# Patient Record
Sex: FEMALE | Race: WHITE
[De-identification: names, ages, dates, MRNs, and addresses within clinical notes are randomized per-mention and may not be internally consistent; named-entity substitution may affect disease eponyms.]

---

## 2017-11-15 NOTE — CARDIAC CATHETERIZATION
Procedure Note


Procedure Date


Nov 15, 2017.





Pre-Procedure Diagnosis


Valvular Disease





AUC Score


7





Post-Procedure Diagnosis


Mild CAD





Procedure(s) Performed


Coronary Angiography, Aortography





Cardiologist


Dylan





Assistant(s)


Liane





Estimated Blood Loss


12





Medication(s)


Fentanyl, Heparin, Nicardipine, Versed, Lidocaine 1%





Summary of Findings


Indication: Severe aortic stenosis, bicuspid aortic valve





Access: 5Fr Right radial artery


Catheters: Tiger, pigtail





Findings:


LM - Angiographically normal


LAD - Large caliber vessel; mid segment with diffuse 30-40% stenosis and 

myocardial bridging; distal segment with luminal irregularities as wraps around 

apex. 1st diagonal with 40% ostial stenosis.


Circumflex - Moderate caliber vessel with 30-40% ostial stenosis;  Luminal 

irregularities in terminal OM2.  


RCA - Dominant, angiographically normal





Aortography - 2+ AI; No thoracic aortic aneurysm.  No coarctation.  





Arterial Closure: TR Band





Summary:


1. Mild to moderate non-obstructive coronary artery disease


 -  30-40% diffuse mid LAD disease with myocardial bridging


 -  30-40% ostial circumflex





2. Moderate aortic insufficiency (2+)





Recommendations:


Planned follow-up with On[facility] CT surgery for AVR 


Continued ASCVD risk factor modification per Dr. Thurston





Hemodynamics


Rest Ao:


102/55/75


Final Ao:


95/51/71


LV:


--





Recommendations


Medical therapy and/or Counseling, valve replacement





Specimens


None





Radiation Exposure (mGy)


377





Contrast (mls)


80 Visi





Fluids (cc crystalloids)


77 NS





Drains


None





Anesthesia


Moderate





Procedural Complication(s)


None





Disposition


Cath Lab Holding/Recovery





ACC Data


Cardiac Status


Clinical evaluation leading to the procedure


CAD Presntation:  Sx unlikely to be ischemic


Anginal Classification:  CCS II


Heart Failure:  No, NYHA Class: CCS I


Cardiogenic Shock w/in 24Hrs:  No


Cardiac Arrest w/in 24Hrs:  No


Imaging studies past 6 months:  Yes


Stress studies past 6 months:  No





Aortography


Aortic Regurgitation:  2+





Closure Device


Percutaneous Entry Location:  Radial


Closure Device:  Radial Band


Recommendations:  Medical therapy and/or Counseling, valve replacement





Intraprocedure Events


Significant Dissection:  No


Perforation:  No

## 2017-11-15 NOTE — PROCEDURE NOTE
Pre-Mod Sedation Assessment


General


Date of Moderate Sedation:


Nov 15, 2017.


Vital Signs:





Vital Signs Past 12 Hours








  Date Time  Temp Pulse Resp B/P (MAP) Pulse Ox O2 Delivery O2 Flow Rate FiO2


 


11/15/17 09:05 36.3 71 16 125/65 98 Room Air  











Review


Cardiovascular:  regular rate, rhythm, no edema


Abdomen:  normal bowel sounds, non tender


Lungs:  chest non-tender, lungs clear


Airway Class:  II





Pre-Sedation Airway Assessment


Oral Cavity:  WNL


Short Thick Neck:  No


Hx of Sleep Apnea:  No


Smoking Status:  Never Smoker


Mallampati Classification:  Class II


ASA Classification:  Class III





Procedure Planning


Contraindications-for Mod Sed:  None


Yes





Notes








The planned sedation has been discussed with the patient and consent obtained.  

I have


identified the patient, determined the appropriateness of sedation and have 

assessed the


patient immediately prior to the procedure.  





All medicine(s) and interventions are by my order.

## 2017-11-15 NOTE — PROCEDURE NOTE
Post-Mod Sedation Assessment


General


Date of Moderate Sedation


Nov 15, 2017.


Vital Signs:





Vital Signs Past 12 Hours








  Date Time  Temp Pulse Resp B/P (MAP) Pulse Ox O2 Delivery O2 Flow Rate FiO2


 


11/15/17 09:05 36.3 71 16 125/65 98 Room Air  











Review - Discharge Criteria


Vital Signs Stable:  Yes


Alert/Oriented/Conversant:  Yes


Returned to Baseline Mental St:  Yes


Nausea Absent/Minimal:  Yes


Pain/Discomfort/Absent/Minimal:  Yes


Normal/Baseline Respirations:  Yes


Active Bleeding?:  No


Pt Received D/C Instructions:  N/A


Prescriptions Given:  Transmitted





Specific Proced. D/C Criteria


Distal Pulses Present (Cardiac:  Yes


Groin site assessed-Card Cath:  N/A


Voided Prior To Discharge:  N/A





Discharged Patients


Adult Escort/Transportation:  Yes

## 2017-11-15 NOTE — DISCHARGE INSTRUCTIONS
Discharge Instructions


Procedure


Procedure Date:


Nov 15, 2017.


Reason for Visit:


Aortic Stenosis Severe *Dr Richardson To Do*.





Discharge


Discharge Date:


Nov 15, 2017.


Discharge Diagnosis:


Aortic stenosis


Last Recorded Wt (Kilograms):  39.5





Anesthesia


Post Anesthesia Instructions:


If you have had General Anesthesia or IV Sedation:





*  Do not drive today.





*  Resume driving when surgeon permits.





*  Do not make important decisions or sign legal documents today.





*  Call surgeon for:


   1.  Temperature elevations greater than 101 degrees F.


   2.  Uncontrollable pain.


   3.  Excessive bleeding.


   4.  Persistent nausea and vomiting.


   5.  Medication intolerance (nausea, vomiting or rash).





*  For nausea and vomiting use only clear liquids such as: tea, soda, bouillon 

until


   nausea subsides, then gradually increase diet as tolerated.





*  If you have any concerns or questions, call your surgeon's office.  If 

physician is 


   unavailable and it is an emergency, call 911 or go to the nearest emergency 

room.





Instructions


Activity Recommendations:  limitations as noted below


Recommended Home Diet:  resume previous diet, low cholesterol


Allergies:  


Coded Allergies:  


     No Known Allergies (Unverified , 3/3/14)





Follow Up


Additional Instructions:


ACTIVITY RECOMMENDATIONS:





It is common to feel weak and fatigue for a few days.





*  Do not drive or operate any motorized equipment for the next


    2 days.





*  Do not lift anything heavier than 10 pounds for the next three


    days.





*  Do not engage in vigorous exercise or any sports for the next


    five days.





*  You may shower the day after your procedure, but do not 


    immerse the area for three days.  Cleanse the site gently with


    soap and water.








SPECIAL CARE INSTRUCTIONS:





* You may replace the pressure dressing or band-aid the morning 


after the procedure.





* After your procedure, it is normal to have a small bruise or small lump


at the site.  Examine your site daily for any change in the bruise or lump,


redness, swelling, drainage or numbness.  


Notify your doctor if any change.





BLEEDING:





* If there is a small amount of bleeding at the site, lie down and apply


firm pressure with a clean cloth for ten minutes.  When the bleeding stops,


lie quietly keeping the procedure limb straight for six hours.  


Notify your doctor as soon as possible.





* If the bleeding does not stop after ten minutes or if there is a large


amount of bleeding or spurting, call 911 immediately.  Continue to lie 


down and hold firm pressure until help arrives.





SKIN IRRITATION:





*  You may experience some redness and/or swelling in the area where radiation 

was


administered.  If any skin irritation occurs, please contact your family 

physician.








FOLLOW UP VISIT:





Keep any scheduled doctor appointments.





Jones Arce Recommendations:


 


Call your doctor if:





*  Temperature above 101 degrees


*  Pain not relieved by pain medicine ordered


*  There is increased drainage or redness from any incision


*  You have any unanswered questions or concerns.





Your Doctors Instructions noted above were prepared by provider Ramón Richardson.


Patient Signature Section:





 Patient Instructions Signature Page














Maribel Louis 











Patient (or Guardian) Signature/Date:____________________________________ I 

have read and understand the instructions given to me by my caregivers.








Caregiver/RN/Doctor Signature/Date:____________________________________











The above-named patient and/or guardian has received patient instructions on 

this date.





























+  Original Patient Signature Page (only) stays with chart.  Please make copy 

for patient.

## 2018-01-22 NOTE — DIAGNOSTIC IMAGING REPORT
ULTRASOUND LEFT UPPER EXTREMITY VENOUS



CLINICAL HISTORY:  Left arm pain.



COMPARISON STUDY: No priors.



TECHNIQUE: Real-time, grayscale, and color Doppler sonography of the deep veins

of the left upper extremity is performed. Compression and augmentation were

utilized.



FINDINGS: There is no sonographic evidence of deep venous thrombosis identified

in the left upper extremity. The left internal jugular, axillary, and brachial

veins are patent and normally compressible. Normal venous waveforms and

augmentation are seen within the left subclavian vein. The cephalic and basilic

veins are clear. The visualized radial and ulnar veins are patent.



IMPRESSION: There is no sonographic evidence of deep venous thrombosis

identified in the left upper extremity.







Electronically signed by:  Wilber Walker M.D.

1/22/2018 2:51 PM



Dictated Date/Time:  1/22/2018 2:51 PM

## 2018-04-16 NOTE — MAMMOGRAPHY REPORT
BILATERAL DIGITAL SCREENING MAMMOGRAM TOMOSYNTHESIS WITH CAD: 4/13/2018

CLINICAL HISTORY: Routine screening.  Patient has no complaints.  





TECHNIQUE:  Breast tomosynthesis in addition to standard 2D mammography was performed. Current study 
was also evaluated with a Computer Aided Detection (CAD) system.  



COMPARISON: Comparison is made to exams dated:  4/13/2010 mammogram - [facility] [address] an
d 12/9/2008.   



BREAST COMPOSITION:  The tissue of both breasts is extremely dense, which lowers the sensitivity of m
ammography.  



FINDINGS:  No suspicious masses, calcifications, or areas of architectural distortion are noted in ei
ther breast.  The breasts are diffusely increased in density and smaller in size compared to the 2010
 and 2008 exams, which may be due to interval weight loss.  A linear scar marker denotes a scar on th
e right superior breast.  Scattered bilateral benign-appearing calcifications are again noted.



IMPRESSION:  ACR BI-RADS CATEGORY 2: BENIGN

There is no mammographic evidence of malignancy. A 1 year screening mammogram is recommended.  The pa
tient will receive written notification of the results.  





Approximately 10% of breast cancers are not detected with mammography. A negative mammographic report
 should not delay biopsy if a clinically suggestive mass is present.



Sheba Angulo M.D.          

ah/:4/13/2018 16:18:14  



Imaging Technologist: Ainsley CHRIS(DARCI)(M), [facility] [address]

letter sent: Normal 1/2  

BI-RADS Code: ACR BI-RADS Category 2: Benign

## 2018-07-29 ENCOUNTER — HOSPITAL ENCOUNTER (OUTPATIENT)
Dept: HOSPITAL 45 - C.LABSPEC | Age: 58
Discharge: HOME | End: 2018-07-29
Attending: INTERNAL MEDICINE
Payer: COMMERCIAL

## 2018-07-29 DIAGNOSIS — D64.9: Primary | ICD-10-CM

## 2018-07-31 LAB
HEMOCCULT STL QL: NEGATIVE

## 2018-08-07 ENCOUNTER — HOSPITAL ENCOUNTER (OUTPATIENT)
Dept: HOSPITAL 45 - C.LAB1850 | Age: 58
Discharge: HOME | End: 2018-08-07
Attending: INTERNAL MEDICINE
Payer: COMMERCIAL

## 2018-08-07 DIAGNOSIS — R74.0: ICD-10-CM

## 2018-08-07 DIAGNOSIS — D64.9: Primary | ICD-10-CM

## 2018-08-07 LAB
ALBUMIN SERPL-MCNC: 3.5 GM/DL (ref 3.4–5)
ALP SERPL-CCNC: 153 U/L (ref 45–117)
ALT SERPL-CCNC: 82 U/L (ref 12–78)
AST SERPL-CCNC: 31 U/L (ref 15–37)
BASOPHILS # BLD: 0.01 K/UL (ref 0–0.2)
BASOPHILS NFR BLD: 0.1 %
BUN SERPL-MCNC: 20 MG/DL (ref 7–18)
CALCIUM SERPL-MCNC: 9.6 MG/DL (ref 8.5–10.1)
CO2 SERPL-SCNC: 28 MMOL/L (ref 21–32)
CREAT SERPL-MCNC: 0.78 MG/DL (ref 0.6–1.2)
EOS ABS #: 0.08 K/UL (ref 0–0.5)
EOSINOPHIL NFR BLD AUTO: 240 K/UL (ref 130–400)
GLUCOSE SERPL-MCNC: 97 MG/DL (ref 70–99)
HCT VFR BLD CALC: 36.8 % (ref 37–47)
HGB BLD-MCNC: 12.1 G/DL (ref 12–16)
IG#: 0.02 K/UL (ref 0–0.02)
IMM GRANULOCYTES NFR BLD AUTO: 14 %
LYMPHOCYTES # BLD: 0.97 K/UL (ref 1.2–3.4)
MCH RBC QN AUTO: 28.5 PG (ref 25–34)
MCHC RBC AUTO-ENTMCNC: 32.9 G/DL (ref 32–36)
MCV RBC AUTO: 86.8 FL (ref 80–100)
MONO ABS #: 0.79 K/UL (ref 0.11–0.59)
MONOCYTES NFR BLD: 11.4 %
NEUT ABS #: 5.06 K/UL (ref 1.4–6.5)
NEUTROPHILS # BLD AUTO: 1.2 %
NEUTROPHILS NFR BLD AUTO: 73 %
PMV BLD AUTO: 9.2 FL (ref 7.4–10.4)
POTASSIUM SERPL-SCNC: 3.8 MMOL/L (ref 3.5–5.1)
PROT SERPL-MCNC: 7.3 GM/DL (ref 6.4–8.2)
RED CELL DISTRIBUTION WIDTH CV: 13.8 % (ref 11.5–14.5)
RED CELL DISTRIBUTION WIDTH SD: 43.9 FL (ref 36.4–46.3)
SODIUM SERPL-SCNC: 136 MMOL/L (ref 136–145)
TRANSFERRIN SERPL-MCNC: 306 MG/DL (ref 200–360)
WBC # BLD AUTO: 6.93 K/UL (ref 4.8–10.8)